# Patient Record
Sex: MALE | Race: WHITE | NOT HISPANIC OR LATINO
[De-identification: names, ages, dates, MRNs, and addresses within clinical notes are randomized per-mention and may not be internally consistent; named-entity substitution may affect disease eponyms.]

---

## 2024-05-29 PROBLEM — Z00.00 ENCOUNTER FOR PREVENTIVE HEALTH EXAMINATION: Status: ACTIVE | Noted: 2024-05-29

## 2024-06-17 ENCOUNTER — APPOINTMENT (OUTPATIENT)
Dept: UROLOGY | Facility: CLINIC | Age: 59
End: 2024-06-17
Payer: COMMERCIAL

## 2024-06-17 VITALS
DIASTOLIC BLOOD PRESSURE: 86 MMHG | OXYGEN SATURATION: 99 % | BODY MASS INDEX: 30.1 KG/M2 | TEMPERATURE: 98.1 F | HEIGHT: 71 IN | HEART RATE: 79 BPM | WEIGHT: 215 LBS | SYSTOLIC BLOOD PRESSURE: 128 MMHG

## 2024-06-17 DIAGNOSIS — H40.9 UNSPECIFIED GLAUCOMA: ICD-10-CM

## 2024-06-17 DIAGNOSIS — Z78.9 OTHER SPECIFIED HEALTH STATUS: ICD-10-CM

## 2024-06-17 DIAGNOSIS — Z80.42 FAMILY HISTORY OF MALIGNANT NEOPLASM OF PROSTATE: ICD-10-CM

## 2024-06-17 DIAGNOSIS — N52.9 MALE ERECTILE DYSFUNCTION, UNSPECIFIED: ICD-10-CM

## 2024-06-17 DIAGNOSIS — Z86.79 PERSONAL HISTORY OF OTHER DISEASES OF THE CIRCULATORY SYSTEM: ICD-10-CM

## 2024-06-17 PROCEDURE — 99204 OFFICE O/P NEW MOD 45 MIN: CPT

## 2024-06-17 PROCEDURE — G2211 COMPLEX E/M VISIT ADD ON: CPT | Mod: NC,1L

## 2024-06-17 RX ORDER — TIMOLOL MALEATE 5 MG/ML
0.5 SOLUTION/ DROPS OPHTHALMIC
Refills: 0 | Status: ACTIVE | COMMUNITY

## 2024-06-17 RX ORDER — SILDENAFIL 100 MG/1
100 TABLET, FILM COATED ORAL
Qty: 30 | Refills: 11 | Status: ACTIVE | COMMUNITY
Start: 2024-06-17 | End: 1900-01-01

## 2024-06-17 RX ORDER — VALSARTAN 40 MG/1
TABLET, COATED ORAL
Refills: 0 | Status: ACTIVE | COMMUNITY

## 2024-06-17 RX ORDER — BRINZOLAMIDE/BRIMONIDINE TARTRATE 10; 2 MG/ML; MG/ML
1-0.2 SUSPENSION/ DROPS OPHTHALMIC
Refills: 0 | Status: ACTIVE | COMMUNITY

## 2024-06-17 RX ORDER — ROSUVASTATIN CALCIUM 5 MG/1
TABLET, FILM COATED ORAL
Refills: 0 | Status: ACTIVE | COMMUNITY

## 2024-06-17 NOTE — HISTORY OF PRESENT ILLNESS
[FreeTextEntry1] : Language: English Accompanied by: Self Contact info: [phone] Referring Provider/PCP: Marisela   Initial H&P 06/17/2024: The patient has been a private practice patient of Dr. Wallace Zaldivar Any Prior paper chart is scanned into the [employer] system. Please see admin   Mr. IVERSON is a very pleasant 59 year-old gentleman who presents today for initial evaluation of establishment of care.  He initially saw Dr. Zaldivar in 2009 for dysuria and left flank pain.  Had CT, which was neg.  Was prescribed cipro.  On f/u, he still had persistent perineal pressure and Cipro was extended.  He also c/o ED and was Rx'd viagra. Then had chronically increased frequency and was dx with BPH and underwent period of observation. He was then switched to cialis for brief period of time but c/o headaches and switched back to viagra.   Today, he states that he feels well with no urinary symptoms.  Happy with sildenafil.  --------------------------------------------------------------------------------------------------------------------------------------- PMHx: HTN, Glaucoma, HLD PSHx: None SHx: Denies x3 FHx: Prostate Ca   All: NKDA --------------------------------------------------------------------------------------------------------------------------------------- Physical Exam: General: NAD, sitting on exam table comfortably HEENT: NCAT, EOMI Resp: breathing comfortably on RA, b/l symm chest rise Cardiac: RRR Abd: SNTND Back: (-) CVAT b/l MSK: ORA moreno/ FROM Psych: appropriate affect  --------------------------------------------------------------------------------------------------------------------------------------- Results: PSA 06/23/2022: 1.20 06/26/2018: 1.17  --------------------------------------------------------------------------------------------------------------------------------------- A/P: 59M with BPH and Erectile Dysfunction.   1. BPH Still not bothered by his urinary symptoms. Will hold off on meds.   Due for annual physical with his PMD next month. Will have PSA checked at that visit and send us a copy.   2. Erectile Dysfunction Sildenafil renewed today - sent to [facility].   I have answered all of his questions today, and I will see him again on f/u in 6 months or sooner PRN.   Plan: - cont sildenafil - f/u PSA (by PMD) - RTC 6 months or sooner PRN  I spent a total of 48 minutes on face-to-face counseling, coordination of care, review of prior records and clinical documentation.

## 2024-09-16 ENCOUNTER — APPOINTMENT (OUTPATIENT)
Dept: UROLOGY | Facility: CLINIC | Age: 59
End: 2024-09-16